# Patient Record
Sex: FEMALE | URBAN - NONMETROPOLITAN AREA
[De-identification: names, ages, dates, MRNs, and addresses within clinical notes are randomized per-mention and may not be internally consistent; named-entity substitution may affect disease eponyms.]

---

## 2022-08-17 ENCOUNTER — APPOINTMENT (RX ONLY)
Dept: URBAN - NONMETROPOLITAN AREA CLINIC 4 | Facility: CLINIC | Age: 42
Setting detail: DERMATOLOGY
End: 2022-08-17

## 2022-08-17 DIAGNOSIS — D22 MELANOCYTIC NEVI: ICD-10-CM | Status: STABLE

## 2022-08-17 PROBLEM — D22.5 MELANOCYTIC NEVI OF TRUNK: Status: ACTIVE | Noted: 2022-08-17

## 2022-08-17 PROBLEM — D22.61 MELANOCYTIC NEVI OF RIGHT UPPER LIMB, INCLUDING SHOULDER: Status: ACTIVE | Noted: 2022-08-17

## 2022-08-17 PROCEDURE — ? COUNSELING

## 2022-08-17 PROCEDURE — 99202 OFFICE O/P NEW SF 15 MIN: CPT

## 2022-08-17 ASSESSMENT — LOCATION DETAILED DESCRIPTION DERM
LOCATION DETAILED: RIGHT ANTERIOR PROXIMAL UPPER ARM
LOCATION DETAILED: MIDDLE STERNUM

## 2022-08-17 ASSESSMENT — LOCATION SIMPLE DESCRIPTION DERM
LOCATION SIMPLE: RIGHT UPPER ARM
LOCATION SIMPLE: CHEST

## 2022-08-17 ASSESSMENT — LOCATION ZONE DERM
LOCATION ZONE: TRUNK
LOCATION ZONE: ARM

## 2024-02-25 ENCOUNTER — OFFICE VISIT (OUTPATIENT)
Dept: URGENT CARE | Facility: CLINIC | Age: 44
End: 2024-02-25
Payer: COMMERCIAL

## 2024-02-25 VITALS
OXYGEN SATURATION: 97 % | RESPIRATION RATE: 18 BRPM | HEART RATE: 88 BPM | TEMPERATURE: 98.6 F | DIASTOLIC BLOOD PRESSURE: 86 MMHG | SYSTOLIC BLOOD PRESSURE: 135 MMHG

## 2024-02-25 DIAGNOSIS — J01.90 ACUTE SINUSITIS, RECURRENCE NOT SPECIFIED, UNSPECIFIED LOCATION: Primary | ICD-10-CM

## 2024-02-25 PROCEDURE — 99203 OFFICE O/P NEW LOW 30 MIN: CPT | Performed by: PHYSICIAN ASSISTANT

## 2024-02-25 RX ORDER — AMOXICILLIN 500 MG/1
500 CAPSULE ORAL EVERY 8 HOURS SCHEDULED
Qty: 21 CAPSULE | Refills: 0 | Status: SHIPPED | OUTPATIENT
Start: 2024-02-25 | End: 2024-03-03

## 2024-02-25 NOTE — PATIENT INSTRUCTIONS
Medication as prescribed  Tylenol Sinus over the counter  Warm compresses over sinuses  Steam treatment (practice proper safety precautions when handing hot liquids/steam)  Over the counter saline nasal spray  Follow up with PCP in 3-5 days.  Proceed to  ER if symptoms worsen.    Eat yogurt with live and active cultures and/or take a probiotic at least 3 hours before or after antibiotic dose. Monitor stool for diarrhea and/or blood. If this occurs, contact primary care doctor ASAP.

## 2024-02-25 NOTE — PROGRESS NOTES
[facility]        NAME: Rosangela Shabazz is a 44 y.o. female  : 1980    MRN: [id number]  DATE: 2024  TIME: 3:13 PM    Assessment and Plan   Acute sinusitis, recurrence not specified, unspecified location [J01.90]  1. Acute sinusitis, recurrence not specified, unspecified location  amoxicillin (AMOXIL) 500 mg capsule            Patient Instructions     Medication as prescribed  Tylenol Sinus over the counter  Warm compresses over sinuses  Steam treatment (practice proper safety precautions when handing hot liquids/steam)  Over the counter saline nasal spray  Follow up with PCP in 3-5 days.  Proceed to  ER if symptoms worsen.    Eat yogurt with live and active cultures and/or take a probiotic at least 3 hours before or after antibiotic dose. Monitor stool for diarrhea and/or blood. If this occurs, contact primary care doctor ASAP.       Chief Complaint     Chief Complaint   Patient presents with    Facial Pain     More on left side onset 3 weeks ago worse today         History of Present Illness       Sinusitis  This is a new problem. The current episode started 1 to 4 weeks ago. The problem has been gradually worsening since onset. There has been no fever. The pain is moderate. Associated symptoms include congestion, ear pain (L) and sinus pressure. Pertinent negatives include no chills, coughing, headaches, shortness of breath or sore throat. Treatments tried: advil cold and sinus; flonase.       Review of Systems   Review of Systems   Constitutional:  Negative for chills and fever.   HENT:  Positive for congestion, ear pain (L), sinus pressure and sinus pain (L sided). Negative for ear discharge, hearing loss, postnasal drip, sore throat, tinnitus and trouble swallowing.    Respiratory:  Negative for cough and shortness of breath.    Gastrointestinal:  Negative for diarrhea, nausea and vomiting.   Musculoskeletal:  Negative for myalgias.   Skin:  Negative for rash.   Neurological:   Negative for headaches.         Current Medications       Current Outpatient Medications:     amoxicillin (AMOXIL) 500 mg capsule, Take 1 capsule (500 mg total) by mouth every 8 (eight) hours for 7 days, Disp: 21 capsule, Rfl: 0    Current Allergies     Allergies as of 02/25/2024    (No Known Allergies)            The following portions of the patient's history were reviewed and updated as appropriate: allergies, current medications, past family history, past medical history, past social history, past surgical history and problem list.     History reviewed. No pertinent past medical history.    No past surgical history on file.    No family history on file.      Medications have been verified.        Objective   /86   Pulse 88   Temp 98.6 °F (37 °C)   Resp 18   SpO2 97%   No LMP recorded.       Physical Exam     Physical Exam  Vitals reviewed.   Constitutional:       General: She is not in acute distress.     Appearance: She is well-developed. She is not diaphoretic.   HENT:      Head: Normocephalic and atraumatic.      Comments: L maxillary and frontal sinus TTP     Right Ear: Tympanic membrane, ear canal and external ear normal.      Left Ear: Tympanic membrane, ear canal and external ear normal.      Nose: Nose normal.      Mouth/Throat:      Pharynx: No oropharyngeal exudate or posterior oropharyngeal erythema.   Eyes:      General:         Right eye: No discharge.         Left eye: No discharge.   Cardiovascular:      Rate and Rhythm: Normal rate and regular rhythm.      Heart sounds: Normal heart sounds. No murmur heard.     No friction rub. No gallop.   Pulmonary:      Effort: Pulmonary effort is normal. No respiratory distress.      Breath sounds: Normal breath sounds. No wheezing, rhonchi or rales.   Lymphadenopathy:      Cervical: No cervical adenopathy.   Skin:     General: Skin is warm.      Findings: No rash.   Neurological:      Mental Status: She is alert.   Psychiatric:         Behavior:  Behavior normal.         Thought Content: Thought content normal.         Judgment: Judgment normal.

## 2025-03-24 ENCOUNTER — OFFICE VISIT (OUTPATIENT)
Dept: URGENT CARE | Facility: CLINIC | Age: 45
End: 2025-03-24
Payer: COMMERCIAL

## 2025-03-24 ENCOUNTER — APPOINTMENT (OUTPATIENT)
Dept: RADIOLOGY | Facility: CLINIC | Age: 45
End: 2025-03-24
Payer: COMMERCIAL

## 2025-03-24 VITALS
TEMPERATURE: 98.5 F | OXYGEN SATURATION: 99 % | HEART RATE: 72 BPM | WEIGHT: 175 LBS | BODY MASS INDEX: 25.92 KG/M2 | SYSTOLIC BLOOD PRESSURE: 136 MMHG | DIASTOLIC BLOOD PRESSURE: 90 MMHG | RESPIRATION RATE: 18 BRPM | HEIGHT: 69 IN

## 2025-03-24 DIAGNOSIS — M25.571 ACUTE RIGHT ANKLE PAIN: ICD-10-CM

## 2025-03-24 DIAGNOSIS — S93.401A SPRAIN OF RIGHT ANKLE, UNSPECIFIED LIGAMENT, INITIAL ENCOUNTER: Primary | ICD-10-CM

## 2025-03-24 PROCEDURE — 99213 OFFICE O/P EST LOW 20 MIN: CPT | Performed by: PHYSICIAN ASSISTANT

## 2025-03-24 PROCEDURE — 73610 X-RAY EXAM OF ANKLE: CPT

## 2025-03-24 RX ORDER — ATORVASTATIN CALCIUM 10 MG/1
TABLET, FILM COATED ORAL
COMMUNITY

## 2025-03-24 NOTE — PROGRESS NOTES
[facility]        NAME: Rosangela Shabazz is a 45 y.o. female  : 1980    MRN: [id number]  DATE: 2025  TIME: 4:55 PM    Assessment and Plan   Sprain of right ankle, unspecified ligament, initial encounter [S93.401A]  1. Sprain of right ankle, unspecified ligament, initial encounter  XR ankle 3+ vw right          Results    XR provider read: no acute fracture or dislocation.     Patient Instructions     Assessment & Plan    Tylenol/Ibuprofen for pain  Wear brace, splint or ACE wrap +/- crutches for support (Remove braces and ACE bandages every 3 hours)  Wear shoe arch support for foot injuries (ex: superfeet insoles)  Ice 20 minutes 3-4 times per day for 3 days  Insulate the skin from the ice to prevent frostbite  Rest and Elevate  Follow up with orthopedic if symptoms do not improve  Follow up with PCP in 3-5 days.  Proceed to  ER if symptoms worsen.    If tests have been performed at [facility], our office will contact you with results if changes need to be made to the care plan discussed with you at the visit.  You can review your full results on [facility] MyChart.    Remove splint/brace and go straight to ER if you experience sudden increase in pain, swelling, change in color/temperature/sensation, chest pain, shortness or breath, or if you start coughing up blood.      Chief Complaint     Chief Complaint   Patient presents with    Ankle Injury     Right ankle  Twisted it when walking down the stairs and missed a step around 2:30am  Tried elevation, ice and , Tylenol  Hurts more upon movement         History of Present Illness     History of Present Illness  The patient is a 45-year-old female who presents for evaluation of a right ankle injury.    She sustained the injury at approximately 2:30 this morning, following a misstep while descending a staircase. The pain is exacerbated by movement, particularly when flexing her toes or stepping in certain ways. She reports experiencing pain on both  "the medial and lateral aspects of her ankle but does not experience any associated numbness or tingling. Despite the pain, she retains the ability to ambulate, although with some discomfort. She has attempted to alleviate the pain through elevation, icing, and the administration of Tylenol. 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    MEDICATIONS  Tylenol    HPI    Review of Systems   Review of Systems   Musculoskeletal:  Positive for joint swelling. Negative for gait problem.   Skin:  Negative for color change.   Neurological:  Negative for weakness and numbness.         Current Medications       Current Outpatient Medications:     atorvastatin (LIPITOR) 10 mg tablet, take one tablet (10mg total) by mouth nightly, Disp: , Rfl:     Current Allergies     Allergies as of 03/24/2025    (No Known Allergies)            The following portions of the patient's history were reviewed and updated as appropriate: allergies, current medications, past family history, past medical history, past social history, past surgical history and problem list.     Past Medical History:   Diagnosis Date    High cholesterol        Past Surgical History:   Procedure Laterality Date    NASAL SEPTUM SURGERY         No family history on file.      Medications have been verified.        Objective   /90   Pulse 72   Temp 98.5 °F (36.9 °C)   Resp 18   Ht 5' 8.5\" (1.74 m)   Wt 79.4 kg (175 lb)   LMP 03/17/2025 (Exact Date)   SpO2 99%   BMI 26.22 kg/m²   Patient's last menstrual period was 03/17/2025 (exact date).       Physical Exam     Physical Exam  Vitals reviewed.   Constitutional:       General: She is not in acute distress.     Appearance: She is well-developed.   Cardiovascular:      Rate and Rhythm: Normal rate and regular rhythm.      Pulses: Normal pulses.      Heart sounds: Normal heart sounds.   Pulmonary:      Effort: Pulmonary effort is " normal. No respiratory distress.      Breath sounds: Normal breath sounds.   Musculoskeletal:         General: Swelling and tenderness present. No deformity. Normal range of motion.      Comments: R lateral and medial malleolus swelling and TTP.    Skin:     General: Skin is warm.      Findings: No bruising or erythema.   Neurological:      Mental Status: She is alert and oriented to person, place, and time.      Sensory: No sensory deficit.      Deep Tendon Reflexes: Reflexes are normal and symmetric.   Psychiatric:         Behavior: Behavior normal.         Thought Content: Thought content normal.         Judgment: Judgment normal.